# Patient Record
Sex: FEMALE | Race: OTHER | URBAN - METROPOLITAN AREA
[De-identification: names, ages, dates, MRNs, and addresses within clinical notes are randomized per-mention and may not be internally consistent; named-entity substitution may affect disease eponyms.]

---

## 2017-07-07 ENCOUNTER — IMPORTED ENCOUNTER (OUTPATIENT)
Dept: URBAN - METROPOLITAN AREA CLINIC 50 | Facility: CLINIC | Age: 77
End: 2017-07-07

## 2019-04-17 ENCOUNTER — IMPORTED ENCOUNTER (OUTPATIENT)
Dept: URBAN - METROPOLITAN AREA CLINIC 50 | Facility: CLINIC | Age: 79
End: 2019-04-17

## 2019-05-20 ENCOUNTER — IMPORTED ENCOUNTER (OUTPATIENT)
Dept: URBAN - METROPOLITAN AREA CLINIC 50 | Facility: CLINIC | Age: 79
End: 2019-05-20

## 2019-07-01 ENCOUNTER — IMPORTED ENCOUNTER (OUTPATIENT)
Dept: URBAN - METROPOLITAN AREA CLINIC 50 | Facility: CLINIC | Age: 79
End: 2019-07-01

## 2021-04-18 ASSESSMENT — TONOMETRY
OD_IOP_MMHG: 10
OS_IOP_MMHG: 10
OD_IOP_MMHG: 12
OS_IOP_MMHG: 12
OD_IOP_MMHG: 12
OS_IOP_MMHG: 12

## 2021-04-18 ASSESSMENT — VISUAL ACUITY
OD_CC: 20/25-2
OD_CC: 20/30-1
OD_CC: 20/30-1
OS_CC: 20/20-1
OS_CC: 20/30-1
OS_CC: 20/25-1

## 2022-11-16 NOTE — PATIENT DISCUSSION
VISUALLY SIGNIFICANT. OPTION OF SURGERY VERSUS FOLLOWING VERSUS UPDATING  GLASSES DISCUSSED. RBA'S DISCUSSED, PATIENT UNDERSTANDS AND DESIRES SURGERY TO INCREASE  VISION FOR READING, WORKING ON THE COMPUTER, GLARE FROM LIGHTS AT NIGHT.  SCHEDULE CATARACT SURGERY/PRE-OP OU WHEN READY.

## 2022-12-14 NOTE — PATIENT DISCUSSION
RBA'S DISCUSSED, PATIENT UNDERSTANDS AND DESIRES TO PROCEED WITHSURGERY. CONSENT READ AND SIGNED. PATIENT DESIRES STANDARD SET FOR NEAR / INTERMEDIATE VISION OS.

## 2023-01-04 NOTE — PATIENT DISCUSSION
IOP elevated postoperatively. Instilled 1gtt Combigan in office and IOP lowered to acceptable limits. Otherwise doing well. Continue post op drops as per instruction sheet. Do not rub or get water in eye. Reviewed endophthalmitis precautions. All questions answered.

## 2023-01-11 NOTE — PATIENT DISCUSSION
Educated patient on findings. Reassured good post-operative appearance and ocular health stable/WNL. Discussed blurred spot more likely floater/vitreal changes. Advised to call/RTC if si/sx persist or worsen. Monitor.

## 2023-05-11 ENCOUNTER — PREPPED CHART (OUTPATIENT)
Dept: URBAN - METROPOLITAN AREA CLINIC 52 | Facility: CLINIC | Age: 83
End: 2023-05-11

## 2023-05-26 ENCOUNTER — NEW PATIENT (OUTPATIENT)
Dept: URBAN - METROPOLITAN AREA CLINIC 52 | Facility: CLINIC | Age: 83
End: 2023-05-26

## 2023-05-26 DIAGNOSIS — H35.3132: ICD-10-CM

## 2023-05-26 DIAGNOSIS — H35.371: ICD-10-CM

## 2023-05-26 PROCEDURE — 92004 COMPRE OPH EXAM NEW PT 1/>: CPT

## 2023-05-26 PROCEDURE — 92134 CPTRZ OPH DX IMG PST SGM RTA: CPT

## 2023-05-26 ASSESSMENT — VISUAL ACUITY
OS_GLARE: 20/60
OD_GLARE: 20/50
OU_CC: J1+
OD_CC: 20/20
OS_CC: 20/20

## 2023-05-26 ASSESSMENT — TONOMETRY
OD_IOP_MMHG: 12
OS_IOP_MMHG: 13

## 2024-08-12 ENCOUNTER — COMPREHENSIVE EXAM (OUTPATIENT)
Dept: URBAN - METROPOLITAN AREA CLINIC 52 | Facility: CLINIC | Age: 84
End: 2024-08-12

## 2024-08-12 DIAGNOSIS — H04.123: ICD-10-CM

## 2024-08-12 DIAGNOSIS — H35.371: ICD-10-CM

## 2024-08-12 DIAGNOSIS — H02.831: ICD-10-CM

## 2024-08-12 DIAGNOSIS — H35.3132: ICD-10-CM

## 2024-08-12 DIAGNOSIS — H02.834: ICD-10-CM

## 2024-08-12 DIAGNOSIS — L71.9: ICD-10-CM

## 2024-08-12 PROCEDURE — 92134 CPTRZ OPH DX IMG PST SGM RTA: CPT

## 2024-08-12 PROCEDURE — 99214 OFFICE O/P EST MOD 30 MIN: CPT

## 2024-08-12 ASSESSMENT — TONOMETRY
OD_IOP_MMHG: 10
OS_IOP_MMHG: 10

## 2024-08-12 ASSESSMENT — VISUAL ACUITY
OD_CC: 20/25
OU_CC: J1+
OS_CC: 20/30

## 2025-07-07 ENCOUNTER — TECH ONLY (OUTPATIENT)
Age: 85
End: 2025-07-07

## 2025-07-07 DIAGNOSIS — H52.4: ICD-10-CM

## 2025-07-07 PROCEDURE — 92015 DETERMINE REFRACTIVE STATE: CPT
